# Patient Record
(demographics unavailable — no encounter records)

---

## 2024-12-17 NOTE — ASSESSMENT
[FreeTextEntry1] : New onset generalized epilepsy:  -MRI of the brain, EEG, 24-hour EEG results reviewed and discussed with patient parents: These tests are standard for evaluating epilepsy. The MRI looks for structural abnormalities, while the EEGs assess electrical activity in the brain to identify seizure patterns. Reviewing the results with the parents ensures they understand the findings. -Blood work for Keppra level, trough:- -Continue with Keppra 500 mg twice daily -Paperwork filled out for the school nurse: This is important to ensure the school is aware of the diagnosis and can take appropriate precautions and respond correctly in case of a seizure at school. Safety precautions given: This likely includes advice on avoiding triggers, ensuring a safe environment at home, and what to do during a seizure. Parents made aware of neuro state law regarding no driving unless seizure-free for a year: This is a standard legal restriction in many places to ensure public safety. Other than that there are no specific restrictions for patients with a seizure disorder, however avoid activities or take precautions can potentially cause bodily injury as advised: This is good general advice. While there aren't typically blanket restrictions, patients should avoid activities where a sudden loss of consciousness could put themselves or others at risk (e.g., swimming alone, working at heights, operating heavy machinery) until seizure control is established.

## 2024-12-17 NOTE — HISTORY OF PRESENT ILLNESS
[FreeTextEntry1] : It's a pleasure to see Mr. SABIHA BOJORQUEZ In the office today. He is a 18 year -  old young man  who presents to the office today along with his parents for the evaluation of new onset seizure.  Patient has autistic spectrum disorder and was previously seen by Dr. Gatica and was sent for MRI of the brain, EEG, 24-hour EEG all which were reviewed normal.  Patient has been on Keppra 500 mg twice daily without any additional seizures.  He has option for intranasal midazolam spray to be used as a rescue medication.  He needs a form filled out for the school nurse to administer.  No new complaints today.